# Patient Record
Sex: FEMALE
[De-identification: names, ages, dates, MRNs, and addresses within clinical notes are randomized per-mention and may not be internally consistent; named-entity substitution may affect disease eponyms.]

---

## 2019-11-01 ENCOUNTER — HOSPITAL ENCOUNTER (EMERGENCY)
Dept: HOSPITAL 92 - ERS | Age: 49
Discharge: HOME | End: 2019-11-01
Payer: SELF-PAY

## 2019-11-01 DIAGNOSIS — M54.2: ICD-10-CM

## 2019-11-01 DIAGNOSIS — D27.0: Primary | ICD-10-CM

## 2019-11-01 DIAGNOSIS — K76.0: ICD-10-CM

## 2019-11-01 DIAGNOSIS — M54.5: ICD-10-CM

## 2019-11-01 DIAGNOSIS — M54.6: ICD-10-CM
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
## 2019-11-01 LAB
ALBUMIN SERPL BCG-MCNC: 4.5 G/DL (ref 3.5–5)
ALP SERPL-CCNC: 72 U/L (ref 40–110)
ALT SERPL W P-5'-P-CCNC: 36 U/L (ref 8–55)
ANION GAP SERPL CALC-SCNC: 19 MMOL/L (ref 10–20)
AST SERPL-CCNC: 32 U/L (ref 5–34)
BASOPHILS # BLD AUTO: 0.1 THOU/UL (ref 0–0.2)
BASOPHILS NFR BLD AUTO: 0.7 % (ref 0–1)
BILIRUB SERPL-MCNC: 0.9 MG/DL (ref 0.2–1.2)
BUN SERPL-MCNC: 15 MG/DL (ref 7–18.7)
CALCIUM SERPL-MCNC: 10.1 MG/DL (ref 7.8–10.44)
CHLORIDE SERPL-SCNC: 105 MMOL/L (ref 98–107)
CO2 SERPL-SCNC: 17 MMOL/L (ref 22–29)
CREAT CL PREDICTED SERPL C-G-VRATE: 0 ML/MIN (ref 70–130)
EOSINOPHIL # BLD AUTO: 0.2 THOU/UL (ref 0–0.7)
EOSINOPHIL NFR BLD AUTO: 2.1 % (ref 0–10)
GLOBULIN SER CALC-MCNC: 3.9 G/DL (ref 2.4–3.5)
GLUCOSE SERPL-MCNC: 118 MG/DL (ref 70–105)
HGB BLD-MCNC: 14 G/DL (ref 12–16)
LYMPHOCYTES # BLD: 3.4 THOU/UL (ref 1.2–3.4)
LYMPHOCYTES NFR BLD AUTO: 36.7 % (ref 21–51)
MCH RBC QN AUTO: 29.2 PG (ref 27–31)
MCV RBC AUTO: 82.1 FL (ref 78–98)
MONOCYTES # BLD AUTO: 0.5 THOU/UL (ref 0.11–0.59)
MONOCYTES NFR BLD AUTO: 5.2 % (ref 0–10)
NEUTROPHILS # BLD AUTO: 5.1 THOU/UL (ref 1.4–6.5)
NEUTROPHILS NFR BLD AUTO: 55.2 % (ref 42–75)
PLATELET # BLD AUTO: 261 THOU/UL (ref 130–400)
POTASSIUM SERPL-SCNC: 4 MMOL/L (ref 3.5–5.1)
RBC # BLD AUTO: 4.79 MILL/UL (ref 4.2–5.4)
SODIUM SERPL-SCNC: 137 MMOL/L (ref 136–145)
WBC # BLD AUTO: 9.3 THOU/UL (ref 4.8–10.8)

## 2019-11-01 PROCEDURE — 51701 INSERT BLADDER CATHETER: CPT

## 2019-11-01 PROCEDURE — 80053 COMPREHEN METABOLIC PANEL: CPT

## 2019-11-01 PROCEDURE — 85025 COMPLETE CBC W/AUTO DIFF WBC: CPT

## 2019-11-01 PROCEDURE — 74177 CT ABD & PELVIS W/CONTRAST: CPT

## 2019-11-01 PROCEDURE — 70450 CT HEAD/BRAIN W/O DYE: CPT

## 2019-11-01 PROCEDURE — 96361 HYDRATE IV INFUSION ADD-ON: CPT

## 2019-11-01 PROCEDURE — 96375 TX/PRO/DX INJ NEW DRUG ADDON: CPT

## 2019-11-01 PROCEDURE — 90471 IMMUNIZATION ADMIN: CPT

## 2019-11-01 PROCEDURE — 90715 TDAP VACCINE 7 YRS/> IM: CPT

## 2019-11-01 PROCEDURE — 71260 CT THORAX DX C+: CPT

## 2019-11-01 PROCEDURE — 36415 COLL VENOUS BLD VENIPUNCTURE: CPT

## 2019-11-01 PROCEDURE — 72125 CT NECK SPINE W/O DYE: CPT

## 2019-11-01 PROCEDURE — 96374 THER/PROPH/DIAG INJ IV PUSH: CPT
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
## 2021-04-15 ENCOUNTER — HOSPITAL ENCOUNTER (INPATIENT)
Dept: HOSPITAL 92 - ERS | Age: 51
Discharge: HOME | DRG: 343 | End: 2021-04-15
Attending: SURGERY | Admitting: SURGERY
Payer: SELF-PAY

## 2021-04-15 VITALS — TEMPERATURE: 98.3 F | SYSTOLIC BLOOD PRESSURE: 163 MMHG | DIASTOLIC BLOOD PRESSURE: 94 MMHG

## 2021-04-15 VITALS — BODY MASS INDEX: 34 KG/M2

## 2021-04-15 DIAGNOSIS — Z88.0: ICD-10-CM

## 2021-04-15 DIAGNOSIS — Z20.828: ICD-10-CM

## 2021-04-15 DIAGNOSIS — K35.80: Primary | ICD-10-CM

## 2021-04-15 DIAGNOSIS — Z82.49: ICD-10-CM

## 2021-04-15 LAB
ALBUMIN SERPL BCG-MCNC: 4.4 G/DL (ref 3.5–5)
ALP SERPL-CCNC: 89 U/L (ref 40–110)
ALT SERPL W P-5'-P-CCNC: 36 U/L (ref 8–55)
ANION GAP SERPL CALC-SCNC: 16 MMOL/L (ref 10–20)
AST SERPL-CCNC: 26 U/L (ref 5–34)
BASOPHILS # BLD AUTO: 0 THOU/UL (ref 0–0.2)
BASOPHILS NFR BLD AUTO: 0.1 % (ref 0–1)
BILIRUB SERPL-MCNC: 2.1 MG/DL (ref 0.2–1.2)
BUN SERPL-MCNC: 9 MG/DL (ref 7–18.7)
CALCIUM SERPL-MCNC: 9.8 MG/DL (ref 7.8–10.44)
CHLORIDE SERPL-SCNC: 102 MMOL/L (ref 98–107)
CO2 SERPL-SCNC: 21 MMOL/L (ref 22–29)
CREAT CL PREDICTED SERPL C-G-VRATE: 0 ML/MIN (ref 70–130)
EOSINOPHIL # BLD AUTO: 0.1 THOU/UL (ref 0–0.7)
EOSINOPHIL NFR BLD AUTO: 0.5 % (ref 0–10)
GLOBULIN SER CALC-MCNC: 3.8 G/DL (ref 2.4–3.5)
GLUCOSE SERPL-MCNC: 202 MG/DL (ref 70–105)
HGB BLD-MCNC: 13.9 G/DL (ref 12–16)
LIPASE SERPL-CCNC: 14 U/L (ref 8–78)
LYMPHOCYTES # BLD: 2.2 THOU/UL (ref 1.2–3.4)
LYMPHOCYTES NFR BLD AUTO: 12.8 % (ref 21–51)
MCH RBC QN AUTO: 29.8 PG (ref 27–31)
MCV RBC AUTO: 84.1 FL (ref 78–98)
MONOCYTES # BLD AUTO: 0.6 THOU/UL (ref 0.11–0.59)
MONOCYTES NFR BLD AUTO: 3.6 % (ref 0–10)
NEUTROPHILS # BLD AUTO: 14.2 THOU/UL (ref 1.4–6.5)
NEUTROPHILS NFR BLD AUTO: 83 % (ref 42–75)
PLATELET # BLD AUTO: 308 THOU/UL (ref 130–400)
POTASSIUM SERPL-SCNC: 3.6 MMOL/L (ref 3.5–5.1)
RBC # BLD AUTO: 4.66 MILL/UL (ref 4.2–5.4)
SODIUM SERPL-SCNC: 135 MMOL/L (ref 136–145)
WBC # BLD AUTO: 17.1 THOU/UL (ref 4.8–10.8)

## 2021-04-15 PROCEDURE — 93005 ELECTROCARDIOGRAM TRACING: CPT

## 2021-04-15 PROCEDURE — 83690 ASSAY OF LIPASE: CPT

## 2021-04-15 PROCEDURE — 96365 THER/PROPH/DIAG IV INF INIT: CPT

## 2021-04-15 PROCEDURE — 85025 COMPLETE CBC W/AUTO DIFF WBC: CPT

## 2021-04-15 PROCEDURE — U0002 COVID-19 LAB TEST NON-CDC: HCPCS

## 2021-04-15 PROCEDURE — 84484 ASSAY OF TROPONIN QUANT: CPT

## 2021-04-15 PROCEDURE — S0020 INJECTION, BUPIVICAINE HYDRO: HCPCS

## 2021-04-15 PROCEDURE — 88304 TISSUE EXAM BY PATHOLOGIST: CPT

## 2021-04-15 PROCEDURE — 74177 CT ABD & PELVIS W/CONTRAST: CPT

## 2021-04-15 PROCEDURE — 96375 TX/PRO/DX INJ NEW DRUG ADDON: CPT

## 2021-04-15 PROCEDURE — 0DTJ4ZZ RESECTION OF APPENDIX, PERCUTANEOUS ENDOSCOPIC APPROACH: ICD-10-PCS | Performed by: SURGERY

## 2021-04-15 PROCEDURE — 80053 COMPREHEN METABOLIC PANEL: CPT
